# Patient Record
Sex: MALE | Race: BLACK OR AFRICAN AMERICAN | NOT HISPANIC OR LATINO | Employment: OTHER | URBAN - METROPOLITAN AREA
[De-identification: names, ages, dates, MRNs, and addresses within clinical notes are randomized per-mention and may not be internally consistent; named-entity substitution may affect disease eponyms.]

---

## 2023-08-04 NOTE — PROGRESS NOTES
Patient ID: Moshe Chapman Jr. is a 73 y.o. male is being seen for consultation today at the request of Magalis Butler MD    Subjective     The patient is here in regards to lower back and neck pain along with some numbness and weakness in his left leg that he noticed a few years ago but has gotten worse over time.     History of Present Illness  Moshe had been developing neck pain and balance issues but has been working with physical therapy to strengthen his core strength in his neck and has also been working on his leg and core strength for balance issues.  He is made significant improvement in those areas and he no longer feels like he is imbalanced and his neck pain has improved significantly.  He has had multiple cervical fusions at C6-7 many years ago and then he had a C4 and C5 corpectomy performed in 2011.  He is healed up well from that.    While in the room and during my examination of the patient I wore a mask and eye protection.  I washed my hands before and after this patient encounter.  The patient was also wearing a mask.    The following portions of the patient's history were reviewed and updated as appropriate: allergies, current medications, past family history, past medical history, past social history, past surgical history and problem list.    Review of Systems   Musculoskeletal:  Positive for back pain and neck pain.   Neurological:  Positive for weakness and numbness.      History reviewed. No pertinent past medical history.    Allergies   Allergen Reactions    Atorvastatin Itching       History reviewed. No pertinent family history.    Social History     Socioeconomic History    Marital status:        History reviewed. No pertinent surgical history.      Objective     Vitals:    08/09/23 1032   BP: 128/70     Body mass index is 28.48 kg/mý.    Physical Exam  Constitutional:       Appearance: Normal appearance.   HENT:      Head: Normocephalic and atraumatic.   Eyes:      Extraocular  Movements: Extraocular movements intact.      Conjunctiva/sclera: Conjunctivae normal.      Pupils: Pupils are equal, round, and reactive to light.   Cardiovascular:      Rate and Rhythm: Normal rate and regular rhythm.      Pulses: Normal pulses.   Pulmonary:      Breath sounds: Normal breath sounds.   Abdominal:      Palpations: Abdomen is soft.   Musculoskeletal:         General: Normal range of motion.      Cervical back: Normal range of motion and neck supple.   Skin:     General: Skin is warm and dry.   Neurological:      Mental Status: He is alert and oriented to person, place, and time.      Cranial Nerves: Cranial nerves 2-12 are intact.      Motor: Motor function is intact. No weakness or atrophy.      Coordination: Coordination is intact. Romberg sign negative. Romberg Test normal.      Gait: Gait is intact. Gait normal.      Deep Tendon Reflexes: Reflexes are normal and symmetric.      Reflex Scores:       Tricep reflexes are 2+ on the right side and 2+ on the left side.       Bicep reflexes are 2+ on the right side and 2+ on the left side.       Brachioradialis reflexes are 2+ on the right side and 2+ on the left side.       Patellar reflexes are 2+ on the right side and 2+ on the left side.       Achilles reflexes are 2+ on the right side and 2+ on the left side.  Psychiatric:         Speech: Speech normal.       Neurologic Exam     Mental Status   Oriented to person, place, and time.   Attention: normal. Concentration: normal.   Speech: speech is normal   Level of consciousness: alert    Cranial Nerves   Cranial nerves II through XII intact.     CN III, IV, VI   Pupils are equal, round, and reactive to light.    Motor Exam   Muscle bulk: normal  Overall muscle tone: normal    Strength   Strength 5/5 except as noted.     Sensory Exam   Light touch normal.     Gait, Coordination, and Reflexes     Gait  Gait: normal    Coordination   Romberg: negative    Reflexes   Reflexes 2+ except as noted.   Right  brachioradialis: 2+  Left brachioradialis: 2+  Right biceps: 2+  Left biceps: 2+  Right triceps: 2+  Left triceps: 2+  Right patellar: 2+  Left patellar: 2+  Right achilles: 2+  Left achilles: 2+  Right Lindsay: absent  Left Lindsay: absent    Assessment & Plan   Independent Review of Radiographic Studies:      I personally reviewed the images from the following studies.    MR: MRI of the cervical spine wo contrast was reviewed and shows loss of normal cervical lordosis due to kyphotic fusion at C3-5 with instrumentation, appears to be fully fused and well-healed.  There is adjacent segment stenosis at C2-3 resulting in narrowing of the spinal canal, however no obvious cord signal change at this level.  There is some cord signal change posterior to the C5 vertebral body which may represent previous spinal cord injury.    Assessment/Plan: Moshe is doing very well overall.  His balance is good and his neck pain is improved and he has negative Marti's.  I do think that he does have cervical stenosis and I cautioned him about balance and falls which may lead to central cord syndrome.  Currently since he is doing very well he is not interested in surgical intervention.  I asked him to call us back if he would like me to order an epidural steroid injection or to proceed with discussion for a C2-3 decompression and instrumentation.    Medical Decision Making:      Follow-up as needed         Diagnoses and all orders for this visit:    1. Spinal stenosis in cervical region (Primary)             Patient Instructions/Recommendations:    Follow-up as needed      Luis Jones MD  08/09/23  10:55 EDT

## 2023-08-09 ENCOUNTER — OFFICE VISIT (OUTPATIENT)
Dept: NEUROSURGERY | Facility: CLINIC | Age: 73
End: 2023-08-09
Payer: OTHER GOVERNMENT

## 2023-08-09 VITALS
DIASTOLIC BLOOD PRESSURE: 70 MMHG | BODY MASS INDEX: 28.44 KG/M2 | HEIGHT: 72 IN | WEIGHT: 210 LBS | SYSTOLIC BLOOD PRESSURE: 128 MMHG

## 2023-08-09 DIAGNOSIS — M48.02 SPINAL STENOSIS IN CERVICAL REGION: Primary | ICD-10-CM

## 2023-08-09 RX ORDER — ISOSORBIDE MONONITRATE 30 MG/1
30 TABLET, EXTENDED RELEASE ORAL DAILY
COMMUNITY

## 2023-08-09 RX ORDER — ASCORBIC ACID 500 MG
500 TABLET ORAL
COMMUNITY

## 2023-08-09 RX ORDER — EZETIMIBE 10 MG/1
1 TABLET ORAL DAILY
COMMUNITY
Start: 2023-07-07

## 2023-08-09 RX ORDER — ASPIRIN 81 MG/1
81 TABLET ORAL DAILY
Qty: 30 TABLET | Refills: 11 | COMMUNITY
Start: 2022-11-03 | End: 2023-11-03

## 2023-08-09 RX ORDER — LEVOTHYROXINE SODIUM 0.15 MG/1
1 TABLET ORAL
COMMUNITY
Start: 2023-07-07

## 2023-08-09 RX ORDER — PRAVASTATIN SODIUM 20 MG
1 TABLET ORAL NIGHTLY
COMMUNITY
Start: 2023-06-26

## 2023-08-09 RX ORDER — LISINOPRIL 20 MG/1
0.5 TABLET ORAL DAILY
COMMUNITY
Start: 2023-06-18

## 2023-08-09 RX ORDER — ALFUZOSIN HYDROCHLORIDE 10 MG/1
10 TABLET, EXTENDED RELEASE ORAL DAILY
COMMUNITY

## 2023-08-09 RX ORDER — LOPERAMIDE HYDROCHLORIDE 2 MG/1
2 CAPSULE ORAL
COMMUNITY
Start: 2023-06-18